# Patient Record
Sex: MALE | Race: WHITE | Employment: OTHER | URBAN - METROPOLITAN AREA
[De-identification: names, ages, dates, MRNs, and addresses within clinical notes are randomized per-mention and may not be internally consistent; named-entity substitution may affect disease eponyms.]

---

## 2023-07-31 ENCOUNTER — HOSPITAL ENCOUNTER (EMERGENCY)
Age: 83
Discharge: HOME | End: 2023-07-31
Payer: MEDICARE

## 2023-07-31 VITALS
TEMPERATURE: 97.6 F | OXYGEN SATURATION: 97 % | HEIGHT: 66 IN | SYSTOLIC BLOOD PRESSURE: 149 MMHG | BODY MASS INDEX: 26.36 KG/M2 | WEIGHT: 164 LBS | RESPIRATION RATE: 16 BRPM | DIASTOLIC BLOOD PRESSURE: 86 MMHG | HEART RATE: 72 BPM

## 2023-07-31 DIAGNOSIS — R33.9 URINARY RETENTION: Primary | ICD-10-CM

## 2023-07-31 DIAGNOSIS — Z98.890 S/P CYSTOSCOPY: ICD-10-CM

## 2023-07-31 LAB
BACTERIA URNS QL MICRO: ABNORMAL /HPF
BILIRUB UR QL STRIP.AUTO: NEGATIVE
CLARITY UR: ABNORMAL
COLOR UR: YELLOW
EPI CELLS #/AREA URNS HPF: ABNORMAL /HPF (ref 0–5)
GLUCOSE UR STRIP.AUTO-MCNC: NEGATIVE MG/DL
HGB UR QL STRIP.AUTO: ABNORMAL
KETONES UR STRIP.AUTO-MCNC: NEGATIVE MG/DL
LEUKOCYTE ESTERASE UR QL STRIP.AUTO: NEGATIVE
MUCOUS THREADS #/AREA URNS LPF: ABNORMAL /LPF
NITRITE UR QL STRIP.AUTO: POSITIVE
PH UR STRIP.AUTO: 6 [PH] (ref 5–8)
PROT UR STRIP.AUTO-MCNC: NEGATIVE MG/DL
RBC #/AREA URNS HPF: >100 /HPF (ref 0–4)
SP GR UR STRIP.AUTO: 1.02 (ref 1–1.03)
UA COMPLETE W REFLEX CULTURE PNL UR: ABNORMAL
UA DIPSTICK W REFLEX MICRO PNL UR: YES
URN SPEC COLLECT METH UR: ABNORMAL
UROBILINOGEN UR STRIP-ACNC: 0.2 E.U./DL
WBC #/AREA URNS HPF: ABNORMAL /HPF (ref 0–5)

## 2023-07-31 PROCEDURE — 51798 US URINE CAPACITY MEASURE: CPT

## 2023-07-31 PROCEDURE — 81001 URINALYSIS AUTO W/SCOPE: CPT

## 2023-07-31 PROCEDURE — 51702 INSERT TEMP BLADDER CATH: CPT

## 2023-07-31 PROCEDURE — 99283 EMERGENCY DEPT VISIT LOW MDM: CPT

## 2023-07-31 ASSESSMENT — LIFESTYLE VARIABLES
HOW OFTEN DO YOU HAVE A DRINK CONTAINING ALCOHOL: NEVER
HOW MANY STANDARD DRINKS CONTAINING ALCOHOL DO YOU HAVE ON A TYPICAL DAY: PATIENT DOES NOT DRINK

## 2023-07-31 ASSESSMENT — PAIN - FUNCTIONAL ASSESSMENT: PAIN_FUNCTIONAL_ASSESSMENT: 0-10

## 2023-07-31 ASSESSMENT — PAIN SCALES - GENERAL: PAINLEVEL_OUTOF10: 9

## 2023-07-31 ASSESSMENT — PAIN DESCRIPTION - LOCATION: LOCATION: PELVIS;PENIS

## 2023-07-31 NOTE — CONSULTS
1732 - PS to Dr Tami Rios at [facility]   Re: Dr. Tami Rios performed cystoscopy this morning. Patient now has urinary retention.   0 - Dr Tami Riso called back to speak with DACIA Rincon

## 2023-07-31 NOTE — ED PROVIDER NOTES
[facility]  ED  EMERGENCY DEPARTMENT ENCOUNTER        Pt Name: Prabhjot Cox  MRN: [id number]  [address] 1940  Date of evaluation: 7/31/2023  Provider: Jeff Rusesll PA-C  PCP: Tara Carrillo MD  ED Attending: Chasidy Morrell DO      CHRISTIN. I have evaluated this patient. CHIEF COMPLAINT:     Chief Complaint   Patient presents with    Post-op Problem     Patient had a cystoscopy this morning @ 0900 with Dr. Shavon Mohan. Patient having pain around penis as well as urinary retention. Pt states he has been drinking fluids since the procedure but only had a few drops of blood urine come out. HISTORY OF PRESENT ILLNESS:      History provided by the patient. No limitations. Prabhjot Cox is a 79 y.o. male who arrives to the ED by private vehicle. The patient underwent cystoscopy today at 9 AM with Dr. Shavon Mohan. He arrives reporting he's been unable to urinate since having the procedure done. He was warned he would have blood in his urine for the next 3-5 days. Patient has not been able to pass really any urine at all. Due to the buildup of discomfort to his pelvis and the pressure, feel like he needs to urinate, he is here. He admits that leading up to the cystoscopy, for about 6 weeks he had difficulties urinating. Nursing Notes were reviewed     REVIEW OF SYSTEMS:     Review of Systems  Positives and pertinent negatives as per HPI. PAST MEDICAL HISTORY:     Past Medical History:   Diagnosis Date    CAD (coronary artery disease) 5/16/2012    HTN (hypertension) 5/16/2012    Hyperlipidemia 5/16/2012       SURGICAL HISTORY:      Past Surgical History:   Procedure Laterality Date    CORONARY ANGIOPLASTY WITH STENT PLACEMENT  6/12/12    JOINT REPLACEMENT      bilateral shoulder       CURRENT MEDICATIONS:       Previous Medications    ASPIRIN 81 MG TABLET    Take 81 mg by mouth daily. ATORVASTATIN (LIPITOR) 10 MG TABLET    Take 10 mg by mouth daily.          ALLERGIES:    Patient